# Patient Record
Sex: FEMALE | Race: BLACK OR AFRICAN AMERICAN | NOT HISPANIC OR LATINO | Employment: FULL TIME | URBAN - METROPOLITAN AREA
[De-identification: names, ages, dates, MRNs, and addresses within clinical notes are randomized per-mention and may not be internally consistent; named-entity substitution may affect disease eponyms.]

---

## 2022-03-22 ENCOUNTER — MEDICAL CORRESPONDENCE (OUTPATIENT)
Dept: HEALTH INFORMATION MANAGEMENT | Facility: CLINIC | Age: 42
End: 2022-03-22
Payer: COMMERCIAL

## 2022-03-22 ENCOUNTER — TRANSFERRED RECORDS (OUTPATIENT)
Dept: HEALTH INFORMATION MANAGEMENT | Facility: CLINIC | Age: 42
End: 2022-03-22
Payer: COMMERCIAL

## 2022-03-23 ENCOUNTER — TELEPHONE (OUTPATIENT)
Dept: ADMINISTRATIVE | Facility: CLINIC | Age: 42
End: 2022-03-23
Payer: COMMERCIAL

## 2022-03-23 NOTE — TELEPHONE ENCOUNTER
Nutrition Education Scheduling Outreach #1:    Call to patient to schedule. Left message with phone number to call to schedule.    Plan for 2nd outreach attempt within 1 week.    Dina Ravi  [employer]  Diabetes and Nutrition Scheduling

## 2022-04-11 ENCOUNTER — VIRTUAL VISIT (OUTPATIENT)
Dept: NUTRITION | Facility: CLINIC | Age: 42
End: 2022-04-11
Payer: COMMERCIAL

## 2022-04-11 DIAGNOSIS — E78.2 MIXED HYPERLIPIDEMIA: ICD-10-CM

## 2022-04-11 DIAGNOSIS — R73.03 PREDIABETES: Primary | ICD-10-CM

## 2022-04-11 PROCEDURE — 97802 MEDICAL NUTRITION INDIV IN: CPT | Mod: 95

## 2022-04-11 NOTE — PATIENT INSTRUCTIONS
Goal:   Include a fruit and/or veggie with each meal    Other tips:      Eat breakfast daily.    2. Try to eat more whole grains and cut back on added sugars and refined grains (white rice, white bread)    3. Meal plan for adding in fruits and vegetables if struggling so you have something available.  Ok to be grab and go (carrots, tomatoes, celery) or add to sandwiches (lettuce, tomatoes, peppers, pesto and Giardiniera) or salad kit.    4. Doing a great job with being active- this helps with lower blood glucose and aids weight loss.   -Aim for 150 minutes of aerobic activity a week and 2-3 days of aerobic activity a week.     5. Losing 5-10% of starting weight is often enough to help improve blood glucose.    6. When grabbing food and snacks, try to use smaller plates and pre-portion.  -Greek yogurt with a few berries, 1 slice toast with nut butter or 1/2 sandwich and veggies, veggie with hummus, 1 oz cheese and whole grain crackers, granola bar with added protein, fruit and few almonds are all healthy options.  -Try to keep snacks between 100-200 calories. Use a mix of carbohydrate (fruit or grain) with small amount protein or fat (nut, nut butter, dressing, cheese) if hungry.    7. Try to include fruits and vegetables with meals and snacks. Try Plate Method at meals:  1/2 plate veggies and/or fruit (raw, canned, frozen all fine)  1/4 plate lean meat (3-4 oz)  1/4 plate grains, ideally whole grains when able (1/2-1 cup rice/pasta/other grains/potatoes OR 1-2 slices bread)    Helpful Website: myplate.gov    FOLLOW UP APPOINTMENT: Will call you again around 12pm (noon) on Friday, May 6th.    Thanks,  Bing Gaviria,  RUSSEL, LD, [facility]   [phone]

## 2022-04-11 NOTE — PROGRESS NOTES
Type of Service: Telephone Visit    Originating Location (Patient Location): Home  Distant Location (Provider Location): Home  Mode of Communication:  Telephone    Telephone Visit Start Time: 10:01am  Telephone Visit End Time (telephone visit stop time): 11:02am    How would patient like to obtain AVS? Mail a copy      Medical Nutrition Therapy  Visit Type:Initial assessment and intervention    Jen Chun presents today for MNT and education related to prediabetes and hyperlipidemia.   She is accompanied by self.     ASSESSMENT:   Patient comments/concerns relating to nutrition: Says last fall she was diagnosed with prediabetes and was told she needs to make changes to prevent diabetes. Says she cut out soda and cut out carbs and simple sugars, eating more fruits and vegetables and cut out fast food.  Says at her recent appointment, was able to reverse effects and had normal labs.     Says she needs guidance on what she should be eating or what she should be shopping for.  Needs something easy, fast and sustainable.     Says she lost about 20 lbs with her changes since fall.  Says she is unsure of her weight. Says she gained weight and was less active with the pandemic.     NUTRITION HISTORY:    Breakfast: 1 cup coffee with blackberries and yogurt  Lunch: sandwich with cajan turkey and cheese OR ham and pepperjack cheese sandwich OR organic shrimp, rice and nikhil OR crab bites, steam bun with lettuce and shrimp  PM: almonds or cheese stick  Dinner: brown rice with chicken and nikhil OR 1 slice pizza, vegetable egg roll and crab bites OR crab bites OR sandwich  Snacks: ham and cheese sandwich OR 1 sl pizza OR white corn chips with salsa and beans OR sandwich and chips with salsa and beans   Snacks on almonds all day and 1 piece dark chocolate  Beverages: Coffee with small amount cane sugar 1x/day and Water/Sparkling water all day    Misses meals? Breakfast   Eats out:  5 meals/per week - going to the grocery  store to grab and go    Previous diet education:  Yes     Food allergies/intolerances: None     Diet is high in: protein  Diet is low in: fiber, fruits and vegetables    EXERCISE: Says she is walking more for 30-45 minutes daily.  Says there is a gym where she lives- inside the apartment building.     SOCIO/ECONOMIC:   Lives with: self    MEDICATIONS:  No current outpatient medications on file.     No current facility-administered medications for this visit.       LABS:  Last Basic Metabolic Panel:  No results found for: NA   No results found for: POTASSIUM  No results found for: CHLORIDE  No results found for: GOMEZ  No results found for: CO2  No results found for: BUN  No results found for: CR  No results found for: GLC    ANTHROPOMETRICS:  Vitals: There were no vitals taken for this visit.  There is no height or weight on file to calculate BMI.       Wt Readings from Last 5 Encounters:   No data found for Wt       Weight Change: unable to assess but per patient, lost 20 lbs    ESTIMATED KCAL REQUIREMENTS:  Unable to assess- no height/weight in chart and nothing in faxed records.  Patient says she is not sure of her weight when asked today or height and weight.    NUTRITION DIAGNOSIS: Food- and nutrition-related knowledge deficit related to changes to help reduce risk for diabetes as evidenced by patient stating she was not sure of the changes to make to reduce diabetes risk and asking for healthy eating tips and tips on grocery shopping.     NUTRITION INTERVENTION:  Nutrition Prescription: Plate Method at meals  Education given to support: general nutrition guidelines, weight reduction, consistent meals, exercise, fiber, behavior modification, portion control and heart healthy diet  Education Materials Provided: 100 Calorie Snacks, Heart Healthy Food Choices, Weight Loss Tips and Preventing Diabetes  Motivational Interviewing    Discussion: Educated on the physiology of pre-diabetes and risk factors. Discussed ways  to help with losing 5-10% of starting body weight, which can reduce insulin resistance. Commended Jen on the changes she made and reassured her making right changes to eat more fruits and vegetables, cut back on sugary foods and fast food and be more active. Suggested plate method for simplicity on balancing meals and meal planning.  Discussed easy ways to incorporate veggies or fruit with meals and trying to buy whole grains when able.  Also encouraged Jen to eat breakfast to see if this reduces hunger at night and discussed quick breakfast options.    Encouraged lean meats and healthy preparation methods to help prevent weight gain and heart-healthy fats in moderation to help lower LDL cholesterol and protect against heart disease.  Commended her on increasing activity to help with better blood glucose utilization and weight loss and informed her national recommendations are 150 minutes aerobic activity a week along with light intensity resistance training 2-3 days a week as tolerated and per MD approval.  Reviewed grocery shopping tips and healthy foods while incorporating plate method as guide as well as healthy snack ideas.  Pt verbalized understanding of concepts discussed and recommendations provided.         PATIENT'S BEHAVIOR CHANGE GOALS:   See Patient Instructions for patient stated behavior change goals. AVS was printed and mailed.    MONITOR / EVALUATE:  RD will monitor/evaluate:  Food / Beverage / Nutrient intake   Pertinent Labs  Progress toward meeting stated nutrition-related goals  Weight change    FOLLOW-UP:  Call RD with questions/concerns.   Follow-up appointment scheduled on 5/6/22.     Bing Gaviria RDN, SOPHIE, LISA   Time spent in minutes: 61 minutes  Encounter: Individual telephone visit

## 2022-05-23 NOTE — TELEPHONE ENCOUNTER
6/9/2022-Voicemail left for patient asking for info on any external records or Images-MR @ 909am      FUTURE VISIT INFORMATION      FUTURE VISIT INFORMATION:    Date: 6/16/2022    Time: 11am    [place] [facility] [place]:    Referring provider:  Jessica Mcneill PA-C     Referring providers clinic:  [facility]     Reason for visit/diagnosis  Headaches     RECORDS REQUESTED FROM:       Clinic name Comments Records Status Imaging Status   [facility]   Scanned to Chart No Images

## 2022-06-13 ENCOUNTER — VIRTUAL VISIT (OUTPATIENT)
Dept: EDUCATION SERVICES | Facility: CLINIC | Age: 42
End: 2022-06-13
Payer: COMMERCIAL

## 2022-06-13 DIAGNOSIS — R73.03 PREDIABETES: Primary | ICD-10-CM

## 2022-06-13 DIAGNOSIS — E78.2 MIXED HYPERLIPIDEMIA: ICD-10-CM

## 2022-06-13 PROCEDURE — 97803 MED NUTRITION INDIV SUBSEQ: CPT | Mod: 95 | Performed by: DIETITIAN, REGISTERED

## 2022-06-13 NOTE — PROGRESS NOTES
MEDICAL NUTRITION THERAPY  Visit Type:Initial assessment and intervention  Type of Service: Telephone Visit    Originating Location (Patient Location): Home  Distant Location (Provider Location): Home  Mode of Communication:  Telephone    Telephone Visit Start Time: 316  Telephone Visit End Time (telephone visit stop time): 354    SUBJECTIVE:   Jen Chun presents today for MNT and education related to prediabetes.   She is accompanied by self.     PATIENT STATED GOAL(S) FOR THIS VISIT: review diet, foods, eating for health and blood sugar control. Previous diet education:  Yes   Doing well with nutrition, but just has a hard time cooking with time restraints / work / life, but is trying to cook more    Discussed role of fiber and whole grains   Shops at [employer] thyme   Discused breakfast ideas; minimal ingredient breakfast bars, high fiber protein waffles etc.   Prescription bars, kodiak cakes  Minimal sancks - chips and salsa infrequently   Likes Dark chocolate   Successfully has quit soda - only bubble water       EXERCISE: moderate / strenuous regular exercise program.  Gym & walking   Activity - greatly increased, now back on site walking more versus always working from home     SOCIO/ECONOMIC:   Lives with: self    OBJECTIVE:   No results found for: A1C  Wt Readings from Last 5 Encounters:   No data found for Wt       ASSESSMENT:   Has already made many diet and lifestyle modifications to reduce blood sugars and hopefully reduce A1c back into the normal range.  Continue making these changes into habits.  Reviewed specific foods to try and ideas to focus on.   Make small goals throughout the summer.    VERBAL INFORMATION GIVEN TO SUPPORT:  Discussed: general nutrition guidelines, labeling, exercise, dining out/special occasions, fiber, behavior modification and heart healthy diet.    PLAN:   PATIENT'S BEHAVIOR CHANGE GOALS:   See Patient Instructions for patient stated behavior change goals. AVS was printed  and given to patient at today's appointment.    FOLLOW UP:   Follow-up appointment scheduled on 8/18/22.     Princess Garcia RD, LD, [facility]  Diabetes Education    Time spent in minutes: 38  Encounter: Individual

## 2022-06-13 NOTE — Clinical Note
"    6/13/2022         RE: Jen Chun  [address] [place] [address] [address]  [facility] [address]        Dear Colleague,    Thank you for referring your patient, Jen Chun, to the [facility] DIABETES EDUCATION [place]. Please see a copy of my visit note below.    MEDICAL NUTRITION THERAPY  Visit Type:Initial assessment and intervention    SUBJECTIVE:   Jen Chun presents today for MNT and education related to prediabetes.   She is accompanied by self.     PATIENT STATED GOAL(S) FOR THIS VISIT: ***    EATING HABITS:   Breakfast: ***  Lunch: ***  Dinner: ***  Snacks: ***  Beverages: {BEVERAGES per day:066088}    Misses meals? ***  Eats out:  {EATS OUT FREQUENCY:950685}     Previous diet education:  {YES/NO :210832::\"Yes\"}     Food allergies/intolerances: ***    EXERCISE: {EXERCISE PATTERN:354810}    SOCIO/ECONOMIC:   Lives with: {CDE FAMILY MEMBERS:784108}    OBJECTIVE:   Vitals: There were no vitals taken for this visit.    Weight Change: ***    ASSESSMENT:   ***  Diet is high in: {DIET ELEMENTS CDE:114933}  Diet is low in: {DIET ELEMENTS CDE:371482}    Estimated Energy Expenditure: *** kcal  Recommended Energy Intake: *** kcal  VERBAL AND WRITTEN INFORMATION GIVEN TO SUPPORT:  Discussed: {DIET EDUCATION:830473}.  Education Materials Provided: {NUTRITION EDUCATIONAL MATERIALS:217872}    PLAN:   PATIENT'S BEHAVIOR CHANGE GOALS:   See Patient Instructions for patient stated behavior change goals. AVS was printed and given to patient at today's appointment.    FOLLOW UP:   {MNT Follow-up:666532}    ***  Time spent in minutes: ***  Encounter: Individual      Doing well with nutrition before hand   Hard time cooking - so busy working     Whole grain bread   Fresh thyme     paretns always ate healthy   Feels better   Losing weight     Activity - greatly increased, now back on site walking more     Trying to cook more     Prescription bars, kodiak cakes  Fiber   miniaml sancks - cheese / chips / salsa  Dark " chocolate   replaced soda with bubble water   Breakfast -     [employer] [facility] in [place], might go to [facility]       MEDICAL NUTRITION THERAPY  Visit Type:Initial assessment and intervention  Type of Service: Telephone Visit    Originating Location (Patient Location): Home  Distant Location (Provider Location): Home  Mode of Communication:  Telephone    Telephone Visit Start Time: 316  Telephone Visit End Time (telephone visit stop time): 354    SUBJECTIVE:   Jen Chun presents today for MNT and education related to prediabetes.   She is accompanied by self.     PATIENT STATED GOAL(S) FOR THIS VISIT: review diet, foods, eating for health and blood sugar control. Previous diet education:  Yes   Doing well with nutrition, but just has a hard time cooking with time restraints / work / life, but is trying to cook more    Discussed role of fiber and whole grains   Shops at [employer]   Discused breakfast ideas; minimal ingredient breakfast bars, high fiber protein waffles etc.   Prescription bars, kodiak cakes  Minimal sancks - chips and salsa infrequently   Likes Dark chocolate   Successfully has quit soda - only bubble water       EXERCISE: moderate / strenuous regular exercise program.  Gym & walking   Activity - greatly increased, now back on site walking more versus always working from home     SOCIO/ECONOMIC:   Lives with: self    OBJECTIVE:   No results found for: A1C  Wt Readings from Last 5 Encounters:   No data found for Wt       ASSESSMENT:   Has already made many diet and lifestyle modifications to reduce blood sugars and hopefully reduce A1c back into the normal range.  Continue making these changes into habits.  Reviewed specific foods to try and ideas to focus on.   Make small goals throughout the summer.    VERBAL INFORMATION GIVEN TO SUPPORT:  Discussed: general nutrition guidelines, labeling, exercise, dining out/special occasions, fiber, behavior modification and heart healthy  diet.    PLAN:   PATIENT'S BEHAVIOR CHANGE GOALS:   See Patient Instructions for patient stated behavior change goals. AVS was printed and given to patient at today's appointment.    FOLLOW UP:   Follow-up appointment scheduled on 8/18/22.     Princess Garcia RD, LD, [facility]  Diabetes Education    Time spent in minutes: 38  Encounter: Individual

## 2022-06-16 ENCOUNTER — VIRTUAL VISIT (OUTPATIENT)
Dept: NEUROLOGY | Facility: CLINIC | Age: 42
End: 2022-06-16
Payer: COMMERCIAL

## 2022-06-16 ENCOUNTER — PRE VISIT (OUTPATIENT)
Dept: NEUROLOGY | Facility: CLINIC | Age: 42
End: 2022-06-16

## 2022-06-16 DIAGNOSIS — R51.9 HEADACHE, WORSENING: Primary | ICD-10-CM

## 2022-06-16 DIAGNOSIS — G43.009 MIGRAINE WITHOUT AURA AND WITHOUT STATUS MIGRAINOSUS, NOT INTRACTABLE: ICD-10-CM

## 2022-06-16 PROCEDURE — 99203 OFFICE O/P NEW LOW 30 MIN: CPT | Mod: GT | Performed by: NURSE PRACTITIONER

## 2022-06-16 RX ORDER — NAPROXEN 500 MG/1
500 TABLET ORAL EVERY 12 HOURS PRN
Qty: 30 TABLET | Refills: 3 | Status: SHIPPED | OUTPATIENT
Start: 2022-06-16 | End: 2022-10-10

## 2022-06-16 RX ORDER — SUMATRIPTAN 50 MG/1
50 TABLET, FILM COATED ORAL
Qty: 12 TABLET | Refills: 9 | Status: SHIPPED | OUTPATIENT
Start: 2022-06-16 | End: 2022-10-10

## 2022-06-16 RX ORDER — RIBOFLAVIN (VITAMIN B2) 100 MG
200 TABLET ORAL DAILY
Qty: 60 TABLET | Refills: 11 | Status: SHIPPED | OUTPATIENT
Start: 2022-06-16 | End: 2022-10-10

## 2022-06-16 ASSESSMENT — HEADACHE IMPACT TEST (HIT 6)
HOW OFTEN DO HEADACHES LIMIT YOUR DAILY ACTIVITIES: RARELY
WHEN YOU HAVE A HEADACHE HOW OFTEN DO YOU WISH YOU COULD LIE DOWN: VERY OFTEN
HOW OFTEN HAVE YOU FELT FED UP OR IRRITATED BECAUSE OF YOUR HEADACHES: NEVER
WHEN YOU HAVE A HEADACHE HOW OFTEN DO YOU WISH YOU COULD LIE DOWN: VERY OFTEN
HIT6 TOTAL SCORE: 51
HOW OFTEN DID HEADACHS LIMIT CONCENTRATION ON WORK OR DAILY ACTIVITY: RARELY
HOW OFTEN HAVE YOU FELT TOO TIRED TO WORK BECAUSE OF YOUR HEADACHES: RARELY
HOW OFTEN HAVE YOU FELT FED UP OR IRRITATED BECAUSE OF YOUR HEADACHES: NEVER
HOW OFTEN DO HEADACHES LIMIT YOUR DAILY ACTIVITIES: RARELY
WHEN YOU HAVE HEADACHES HOW OFTEN IS THE PAIN SEVERE: SOMETIMES
HOW OFTEN DID HEADACHS LIMIT CONCENTRATION ON WORK OR DAILY ACTIVITY: RARELY
WHEN YOU HAVE HEADACHES HOW OFTEN IS THE PAIN SEVERE: SOMETIMES
HOW OFTEN HAVE YOU FELT TOO TIRED TO WORK BECAUSE OF YOUR HEADACHES: RARELY
HIT6 TOTAL SCORE: 51

## 2022-06-16 ASSESSMENT — MIGRAINE DISABILITY ASSESSMENT (MIDAS)
TOTAL SCORE: 120
HOW MANY DAYS DID YOU NOT DO HOUSEWORK BECAUSE OF HEADACHES: 15
HOW MANY DAYS WAS YOUR PRODUCTIVITY CUT IN HALF BECAUSE OF HEADACHES: 30
HOW MANY DAYS IN THE PAST 3 MONTHS HAVE YOU HAD A HEADACHE: 15
HOW MANY DAYS WAS HOUSEWORK PRODUCTIVITY CUT IN HALF DUE TO HEADACHES: 15
HOW MANY DAYS DID YOU MISS WORK OR SCHOOL BECAUSE OF HEADACHES: 30
HOW OFTEN WERE SOCIAL ACTIVITIES MISSED DUE TO HEADACHES: 30
ON A SCALE FROM 0-10 ON AVERAGE HOW PAINFUL WERE HEADACHES: 6

## 2022-06-16 NOTE — LETTER
6/16/2022       RE: Jen Chun  [address]  [facility] [address]     Dear Colleague,    Thank you for referring your patient, Jen Chun, to the [facility] NEUROLOGY CLINIC [place] [facility]. Please see a copy of my visit note below.    ASSESSMENT AND PLAN:  Headaches appear to be migrainous in phenotype. Headaches in the past but more frequent and severe in Jan 2022. Improved with leaving job.   Plan  Baseline brain MRI for worsening headaches or pattern change  Optimizing rescue treatment -  A trial of sumatriptan at headache onset and limit use to no more than 9 days per month   May take sumatriptan with naproxen   Naproxen 2 tabs OTC every 12 hours as needed with food but limit use of naproxen or other OTC analgesics to no more than 14 days per month   Ondansetron as needed for nausea   Preventative treatment -may consider if headaches were more frequent 10 or more per month    In meanwhile prevention -stay hydrated, sleep routine  Vitamin B2 200-400 mg OTC daily -has a mild evidence to help with migraine headaches   Follow up in 3 months or sooner if needed       Subjective   Jen is a 41 year old, presenting for the following health issues:Headache       HPI   Headaches onset in Jan 2022 but occasional in the past but not as severe. 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   Fr-feb -almost daily and on leave in March -less in March and April   May -7 total -duration all day -24 hours   Last big headache a month ago  Loc-top of the head-feels like a splitting headache and sometimes holocephalic-throbbing and severe enough that puts patient in bed for a few days  Associated with nausea, throwing up sometimes, not so much noise sensitivity but a mild light sensitivity, fatigue  "and tiredness  No visual changes  No numbness or weakness in the UE or LE     Gets monthly cycle but headaches are not correlated with menstrual cycle  No head injuries     [id number] had COVID19 -works remotely and lives a lot.     FH-No family history of headaches     Headache treatment   Tylenol     Objective    Vitals - Patient Reported  Weight (Patient Reported): 68 kg (150 lb)  Height (Patient Reported): 165.1 cm (5' 5\")  BMI (Based on Pt Reported Ht/Wt): 24.96  Pain Score: Mild Pain (2)  Pain Loc: Head      Physical Exam   GENERAL: Healthy, alert and no distress  EYES: Eyes grossly normal to inspection.  No discharge or erythema, or obvious scleral/conjunctival abnormalities.  RESP: No audible wheeze, cough, or visible cyanosis.  No visible retractions or increased work of breathing.    SKIN: Visible skin clear. No significant rash, abnormal pigmentation or lesions.  NEURO: Face is symmetrical and symmetrical facial expressions, no weakness.  Mentation and speech appropriate for age.  PSYCH: Mentation appears normal, affect normal, judgement and insight intact, normal speech and appearance well-groomed.    I discussed all my recommendations with Jen Chun who verbalizes understanding and comfortable with the plan.  All of patient's questions were answered from the best of my knowledge.  Patient is in agreement with the plan.     36 minutes spent on the date of the encounter doing video access, chart  review,  results review,  meds review, treatment plan, documentation and further activities as noted above    FATOUMATA Ornelas, CNP [facility]  Headache certified  [facility]  "

## 2022-06-16 NOTE — PROGRESS NOTES
Jen is a 41 year old who is being evaluated via a billable video visit.      How would you like to obtain your AVS? Mail a copy  If the video visit is dropped, the invitation should be resent by: Send to e-mail at: [email or web address]  Will anyone else be joining your video visit? No        Video-Visit Details    Video Start Time: 11:03 AM    Type of service:  Video Visit    Video End Time:11:34 AM    Originating Location (pt. Location): Home    Distant Location (provider location):  [facility] NEUROLOGY [facility]     Platform used for Video Visit: Mantex       ASSESSMENT AND PLAN:  Headaches appear to be migrainous in phenotype. Headaches in the past but more frequent and severe in Jan 2022. Improved with leaving job.   Plan  Baseline brain MRI for worsening headaches or pattern change  Optimizing rescue treatment -  A trial of sumatriptan at headache onset and limit use to no more than 9 days per month   May take sumatriptan with naproxen   Naproxen 2 tabs OTC every 12 hours as needed with food but limit use of naproxen or other OTC analgesics to no more than 14 days per month   Ondansetron as needed for nausea   Preventative treatment -may consider if headaches were more frequent 10 or more per month    In meanwhile prevention -stay hydrated, sleep routine  Vitamin B2 200-400 mg OTC daily -has a mild evidence to help with migraine headaches   Follow up in 3 months or sooner if needed       Subjective   Jen is a 41 year old, presenting for the following health issues:Headache       HPI   Headaches onset in Jan 2022 but occasional in the past but not as severe. 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   Fr-feb -almost daily and on leave in March -less in March and April   May -7 total -duration all day -24 hours   Last big headache a  "month ago  Loc-top of the head-feels like a splitting headache and sometimes holocephalic-throbbing and severe enough that puts patient in bed for a few days  Associated with nausea, throwing up sometimes, not so much noise sensitivity but a mild light sensitivity, fatigue and tiredness  No visual changes  No numbness or weakness in the UE or LE     Gets monthly cycle but headaches are not correlated with menstrual cycle  No head injuries     [id number] had COVID19 -works remotely and lives a lot.     FH-No family history of headaches     Headache treatment   Tylenol     Objective    Vitals - Patient Reported  Weight (Patient Reported): 68 kg (150 lb)  Height (Patient Reported): 165.1 cm (5' 5\")  BMI (Based on Pt Reported Ht/Wt): 24.96  Pain Score: Mild Pain (2)  Pain Loc: Head      Physical Exam   GENERAL: Healthy, alert and no distress  EYES: Eyes grossly normal to inspection.  No discharge or erythema, or obvious scleral/conjunctival abnormalities.  RESP: No audible wheeze, cough, or visible cyanosis.  No visible retractions or increased work of breathing.    SKIN: Visible skin clear. No significant rash, abnormal pigmentation or lesions.  NEURO: Face is symmetrical and symmetrical facial expressions, no weakness.  Mentation and speech appropriate for age.  PSYCH: Mentation appears normal, affect normal, judgement and insight intact, normal speech and appearance well-groomed.    I discussed all my recommendations with Jen Chun who verbalizes understanding and comfortable with the plan.  All of patient's questions were answered from the best of my knowledge.  Patient is in agreement with the plan.     36 minutes spent on the date of the encounter doing video access, chart  review,  results review,  meds review, treatment plan, documentation and further activities as noted above    FATOUMATA Ornelas, CNP [facility]  Headache certified  [facility]    "

## 2022-06-16 NOTE — PATIENT INSTRUCTIONS
Plan  Baseline brain MRI for worsening headaches or pattern change  Optimizing rescue treatment -  A trial of sumatriptan at headache onset and limit use to no more than 9 days per month   May take sumatriptan with naproxen   Naproxen 2 tabs OTC every 12 hours as needed with food but limit use of naproxen or other OTC analgesics to no more than 14 days per month   Ondansetron as needed for nausea   Preventative treatment -may consider if headaches were more frequent 10 or more per month    In meanwhile prevention -stay hydrated, sleep routine  Vitamin B2 200-400 mg OTC daily -has a mild evidence to help with migraine headaches   Follow up in 3 months or sooner if needed

## 2022-06-26 ENCOUNTER — ANCILLARY PROCEDURE (OUTPATIENT)
Dept: MRI IMAGING | Facility: CLINIC | Age: 42
End: 2022-06-26
Attending: NURSE PRACTITIONER
Payer: COMMERCIAL

## 2022-06-26 DIAGNOSIS — R51.9 HEADACHE, WORSENING: ICD-10-CM

## 2022-06-26 PROCEDURE — 70551 MRI BRAIN STEM W/O DYE: CPT | Performed by: RADIOLOGY

## 2022-06-30 ENCOUNTER — TELEPHONE (OUTPATIENT)
Dept: NEUROLOGY | Facility: CLINIC | Age: 42
End: 2022-06-30

## 2022-06-30 NOTE — TELEPHONE ENCOUNTER
"I called pt to let her know Rita reviewed her brain MRI results. Per Rita, \"Brain MRI results reviewed and nothing acute at this time. The findings are non specific. We'll review images with patient at patient's next follow up visit.\"    Pt has no questions. I will send her a copy of MRI report as she does not have mychart set up.     Anel ALEXIS  "

## 2022-08-18 ENCOUNTER — VIRTUAL VISIT (OUTPATIENT)
Dept: EDUCATION SERVICES | Facility: CLINIC | Age: 42
End: 2022-08-18
Payer: COMMERCIAL

## 2022-08-18 DIAGNOSIS — R73.03 PREDIABETES: Primary | ICD-10-CM

## 2022-08-18 DIAGNOSIS — E78.2 MIXED HYPERLIPIDEMIA: ICD-10-CM

## 2022-08-18 PROCEDURE — 97802 MEDICAL NUTRITION INDIV IN: CPT | Mod: 95 | Performed by: DIETITIAN, REGISTERED

## 2022-08-18 NOTE — LETTER
8/18/2022         RE: Jen Chun  [address] [place] [address] [address]  [facility] [address]        Dear Colleague,    Thank you for referring your patient, Jen Chun, to the [facility] DIABETES EDUC[facility] [place]. Please see a copy of my visit note below.    MEDICAL NUTRITION THERAPY  Visit Type:Reassessment and intervention  Type of Service: Telephone Visit    Originating Location (Patient Location): Other work  Distant Location (Provider Location): Home  Mode of Communication:  Telephone    Telephone Visit Start Time: 1206  Telephone Visit End Time (telephone visit stop time): 1226    SUBJECTIVE:   Jen Chun presents today for MNT and education related to prediabetes.   She is accompanied by self.     PATIENT STATED GOAL(S) FOR THIS VISIT:  Review foods / meal planning     Nutrition   Went back to work in person 5 days a week so is now packing foods instead of being able to plan at home  Is working on healthy cooking incorporating many types of vegetables, whole grains etc.   Trying to bring food to work versus going out  Breakfast - trying to find quicker foods to add versus just coffee   Shops at [employer]     EXERCISE: not assessed at this visit     SOCIO/ECONOMIC:   Lives with: self    OBJECTIVE:   No results found for: A1C  Wt Readings from Last 5 Encounters:   No data found for Wt       ASSESSMENT:   Jen is doing well focusing on a healthy diet.  Is watching portions, focusing on healthy choices.  Do not recommend tracking / calorie counting and instead using more intuitive eating skills.  Reviewed certain foods items to try adding.  Focus on key ideas such as protein sources and building meals around this, whole grains, high fiber, healthy fats etc.       VERBAL AND WRITTEN INFORMATION GIVEN TO SUPPORT:  Discussed: general nutrition guidelines, labeling, dining out/special occasions, fiber, behavior modification and portion control.  PLAN:   PATIENT'S BEHAVIOR CHANGE GOALS:    Continue with positive diet & lifestyle changes       FOLLOW UP:   Follow-up appointment scheduled on 10/13.     Princess Garcia RD, LD, [facility]  Diabetes Education    Time spent in minutes: 20  Encounter: Individual

## 2022-10-10 ENCOUNTER — OFFICE VISIT (OUTPATIENT)
Dept: NEUROLOGY | Facility: CLINIC | Age: 42
End: 2022-10-10
Payer: COMMERCIAL

## 2022-10-10 VITALS
OXYGEN SATURATION: 96 % | HEART RATE: 101 BPM | WEIGHT: 201.5 LBS | SYSTOLIC BLOOD PRESSURE: 132 MMHG | DIASTOLIC BLOOD PRESSURE: 84 MMHG

## 2022-10-10 DIAGNOSIS — R90.82 WHITE MATTER DISEASE, UNSPECIFIED: Primary | ICD-10-CM

## 2022-10-10 DIAGNOSIS — G43.009 MIGRAINE WITHOUT AURA AND WITHOUT STATUS MIGRAINOSUS, NOT INTRACTABLE: ICD-10-CM

## 2022-10-10 PROCEDURE — 99213 OFFICE O/P EST LOW 20 MIN: CPT | Performed by: NURSE PRACTITIONER

## 2022-10-10 RX ORDER — SUMATRIPTAN 50 MG/1
50 TABLET, FILM COATED ORAL
Qty: 12 TABLET | Refills: 11 | Status: SHIPPED | OUTPATIENT
Start: 2022-10-10 | End: 2024-04-18

## 2022-10-10 RX ORDER — NAPROXEN 500 MG/1
500 TABLET ORAL EVERY 12 HOURS PRN
Qty: 30 TABLET | Refills: 3 | Status: SHIPPED | OUTPATIENT
Start: 2022-10-10 | End: 2024-04-18

## 2022-10-10 RX ORDER — RIBOFLAVIN (VITAMIN B2) 100 MG
200 TABLET ORAL DAILY
Qty: 60 TABLET | Refills: 12 | Status: SHIPPED | OUTPATIENT
Start: 2022-10-10 | End: 2024-04-18

## 2022-10-10 RX ORDER — ACETAMINOPHEN 500 MG
500-1000 TABLET ORAL EVERY 6 HOURS PRN
COMMUNITY

## 2022-10-10 ASSESSMENT — PAIN SCALES - GENERAL: PAINLEVEL: NO PAIN (0)

## 2022-10-10 NOTE — PATIENT INSTRUCTIONS
Plan:  Headache treatment -no changes -sumatriptan as needed and limit use to no more than 9 days per month   Naproxen as needed   Blood pressure monitor   Keep blood sugar controlled   Updated eye exam   Return 6-9 months  or sooner if needed -sooner if headache coming back

## 2022-10-10 NOTE — PROGRESS NOTES
Ayesha Li is a 41 year old presenting for the following health issues: headache   RECHECK (3 mo follow up)  Last Headache Clinic visit 6/16/2022, see note for details.   Headaches onset in Jan 2022 but occasional in the past but not as severe. 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   Today reports Less frequent headaches since initial Headache Clinic visit. Tried rescue treatment - sumatriptan and naproxen in combination -felt much better in 2-3 hours. No side effects and felt better.   Sumatriptan use 2-3 times since last visit in June 2022.   Vit B2 for prevention.     Last fall was Dx -preDM and dietary changes.   Brain MRI without contrast -non specific changes. No symptoms to explain.   Vision is Ok.     Plan:  Headache treatment -no changes -sumatriptan as needed and limit use to no more than 9 days per month   Naproxen as needed   Blood pressure monitor   Keep blood sugar controlled   Updated eye exam   Return 6-9 months  or sooner if needed -sooner if headache coming back     Objective    /84 (BP Location: Right arm, Patient Position: Sitting, Cuff Size: Adult Large)   Pulse 101   Wt 91.4 kg (201 lb 8 oz)   SpO2 96%   There is no height or weight on file to calculate BMI.  Physical Exam   GENERAL: healthy, alert and no distress  EYES: Eyes grossly normal to inspection, PERRL and conjunctivae and sclerae normal  NEURO: Normal strength and tone, mentation intact and speech normal  PSYCH: mentation appears normal, affect normal/bright    Diagnostic Test Results: reviewed and non specific findings  MR BRAIN W/O CONTRAST 6/26/2022 1:06 PM     Comparison:  No similar studies.      Technique: Sagittal T1-weighted, coronal T2-weighted, axial T2 FLAIR,  axial susceptibility weighted, and axial diffusion-weighted with ADC  map images of the brain  were obtained without intravenous contrast.     Findings: Several scattered nonspecific foci of T2 white matter  hyperintensity in both cerebral hemispheres, greatest in the frontal  lobes. No intracranial mass lesion, mass effect, midline shift, or  abnormal fluid collection. The ventricles and sulci are normal for  age. No abnormality of reduced diffusion.  Normal intravascular flow  voids.                                                                      Impression: No acute intracranial pathology. Several scattered  nonspecific foci of T2 white matter hyperintensity, slightly out of  proportion to age.     ANJU SOSA MD     I discussed all my recommendations with Jen Chun who verbalizes understanding and comfortable with the plan.  All of patient's questions were answered from the best of my knowledge.  Patient is in agreement with the plan.     25 minutes spent on the date of the encounter doing face to face  access, chart  review, exam, results review,  meds review, treatment plan, documentation and further activities as noted above    FATOUMATA Ornelas, CNP [facility]  Headache certified  [facility]

## 2022-10-10 NOTE — LETTER
10/10/2022       RE: Jen Chun  [address]  [facility] [address]     Dear Colleague,    Thank you for referring your patient, Jen Chun, to the [facility] NEUROLOGY CLINIC [place] at [facility]. Please see a copy of my visit note below.      Subjective   Jen is a 41 year old presenting for the following health issues: headache   RECHECK (3 mo follow up)  Last Headache Clinic visit 6/16/2022, see note for details.   Headaches onset in Jan 2022 but occasional in the past but not as severe. 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   Today reports Less frequent headaches since initial Headache Clinic visit. Tried rescue treatment - sumatriptan and naproxen in combination -felt much better in 2-3 hours. No side effects and felt better.   Sumatriptan use 2-3 times since last visit in June 2022.   Vit B2 for prevention.     Last fall was Dx -preDM and dietary changes.   Brain MRI without contrast -non specific changes. No symptoms to explain.   Vision is Ok.     Plan:  Headache treatment -no changes -sumatriptan as needed and limit use to no more than 9 days per month   Naproxen as needed   Blood pressure monitor   Keep blood sugar controlled   Updated eye exam   Return 6-9 months  or sooner if needed -sooner if headache coming back     Objective    /84 (BP Location: Right arm, Patient Position: Sitting, Cuff Size: Adult Large)   Pulse 101   Wt 91.4 kg (201 lb 8 oz)   SpO2 96%   There is no height or weight on file to calculate BMI.  Physical Exam   GENERAL: healthy, alert and no distress  EYES: Eyes grossly normal to inspection, PERRL and conjunctivae and sclerae normal  NEURO: Normal strength and tone, mentation intact and speech normal  PSYCH: mentation appears normal, affect  normal/bright    Diagnostic Test Results: reviewed and non specific findings  MR BRAIN W/O CONTRAST 6/26/2022 1:06 PM     Comparison:  No similar studies.      Technique: Sagittal T1-weighted, coronal T2-weighted, axial T2 FLAIR,  axial susceptibility weighted, and axial diffusion-weighted with ADC  map images of the brain were obtained without intravenous contrast.     Findings: Several scattered nonspecific foci of T2 white matter  hyperintensity in both cerebral hemispheres, greatest in the frontal  lobes. No intracranial mass lesion, mass effect, midline shift, or  abnormal fluid collection. The ventricles and sulci are normal for  age. No abnormality of reduced diffusion.  Normal intravascular flow  voids.                                                                      Impression: No acute intracranial pathology. Several scattered  nonspecific foci of T2 white matter hyperintensity, slightly out of  proportion to age.     ANJU SOSA MD     I discussed all my recommendations with Jen Chun who verbalizes understanding and comfortable with the plan.  All of patient's questions were answered from the best of my knowledge.  Patient is in agreement with the plan.     25 minutes spent on the date of the encounter doing face to face  access, chart  review, exam, results review,  meds review, treatment plan, documentation and further activities as noted above        Again, thank you for allowing me to participate in the care of your patient.      Sincerely,    FATOUMATA Donohue CNP

## 2022-10-13 ENCOUNTER — VIRTUAL VISIT (OUTPATIENT)
Dept: EDUCATION SERVICES | Facility: CLINIC | Age: 42
End: 2022-10-13
Payer: COMMERCIAL

## 2022-10-13 DIAGNOSIS — R73.03 PREDIABETES: Primary | ICD-10-CM

## 2022-10-13 PROCEDURE — 97803 MED NUTRITION INDIV SUBSEQ: CPT | Mod: 95 | Performed by: DIETITIAN, REGISTERED

## 2022-10-13 NOTE — PROGRESS NOTES
MEDICAL NUTRITION THERAPY  Visit Type:reassessment and intervention    Type of Service: Telephone Visit    Originating Location (Patient Location): Home  Distant Location (Provider Location): Home  Mode of Communication:  Telephone    Telephone Visit Start Time: 1220  Telephone Visit End Time (telephone visit stop time): 1251      SUBJECTIVE:   Jen Chun presents today for MNT and education related to prediabetes.   She is accompanied by self.     PATIENT STATED GOAL(S) FOR THIS VISIT: Reviewed diet / foods / routines   New work routine - on site more and is eating more at work - going out for lunches etc.  Trying to find a new healthy pattern with this major change in work and lifestyle.     Diet  Bringing some foods to work   Often getting Salads / sandwiches / sushi   Reviewed snack ideas   Fruits, nuts, bars, mixed macros, increasing fiber     EXERCISE:   More active and reviewed pathophysiology of how activity lower blood sugars   leaving the house more.  Loves yoga and is making time for this   Apartment has a gym for acitity and does this in the evenings.   works out in the evening - 30-60 minutes     OBJECTIVE:    recommend new A1c for follow up     ASSESSMENT:   Continue with positive diet & lifestyle changes.  REviewed techniues with going out to eat, foods to bring to work.  Follow-up later this fall / early winter.   Discussed: general nutrition guidelines, labeling, exercise, dining out/special occasions, fiber and behavior modification.    PLAN:   PATIENT'S BEHAVIOR CHANGE GOALS:   Continue with positive diet & lifestyle changes   Follow up in November       FOLLOW UP:   Follow up with RD as needed.  New a1c - establish     Princess Garcia RD, LD, [facility]  Diabetes Education    Time spent in minutes: 30  Encounter: Individual

## 2022-12-08 ENCOUNTER — VIRTUAL VISIT (OUTPATIENT)
Dept: EDUCATION SERVICES | Facility: CLINIC | Age: 42
End: 2022-12-08
Payer: COMMERCIAL

## 2022-12-08 DIAGNOSIS — R73.03 PREDIABETES: Primary | ICD-10-CM

## 2022-12-08 PROCEDURE — 97802 MEDICAL NUTRITION INDIV IN: CPT | Mod: 95 | Performed by: DIETITIAN, REGISTERED

## 2022-12-08 NOTE — LETTER
12/8/2022         RE: Jen Chun  [address]  [facility] [address]        Dear Colleague,    Thank you for referring your patient, Jen Chun, to the [facility]ABETES [facility]UC[facility] [place]. Please see a copy of my visit note below.    MEDICAL NUTRITION THERAPY  Visit Type:Reassessment and intervention  Type of Service: Telephone Visit     Originating Location (Patient Location): Other work  Distant Location (Provider Location): Home  Mode of Communication:  Telephone     Telephone Visit Start Time: 1206  Telephone Visit End Time (telephone visit stop time): 1220     SUBJECTIVE:   Jen Chun presents today for MNT and education related to prediabetes.   She is accompanied by self.      PATIENT STATED GOAL(S) FOR THIS VISIT:  General check in     Nutrition   Shops at [employer] - doing a lot of day of shopping to reduce waste      EXERCISE: not assessed at this visit      SOCIO/ECONOMIC:   Lives with: self     OBJECTIVE:   No results found for: A1C  Wt Readings from Last 5 Encounters:   No data found for Wt         ASSESSMENT:   Jen is doing well focusing on a healthy diet.  Is watching portions, focusing on healthy choices. Overall is feeling better and more energetic.  Will make follow up with PCP soon for labs.     Focusing on increasing activity and is doing this most days 5-7 times weekly.    20 minutes cardio + weight training + yoga     Working a hybrid schedule again and finding a good balance   Being very mindful of choices with food   Increasing salads / vegetables   Small choices that add up  Balance of fat / protein / carbohydrates / fiber    Goal - water, was drinking when working from home more   Less coffee     VERBAL AND WRITTEN INFORMATION GIVEN TO SUPPORT:  Discussed: general nutrition guidelines, labeling, dining out/special occasions, fiber, behavior modification and portion control.    PLAN:   PATIENT'S BEHAVIOR CHANGE GOALS:   Continue with positive  diet & lifestyle changes         FOLLOW UP:   Follow-up appointment scheduled February 2023     Princess Garcia RD, LD, [facility]  Diabetes Education     Time spent in minutes: 14  Encounter: Individual    PCP / referral

## 2022-12-08 NOTE — PROGRESS NOTES
MEDICAL NUTRITION THERAPY  Visit Type:Reassessment and intervention  Type of Service: Telephone Visit     Originating Location (Patient Location): Other work  Distant Location (Provider Location): Home  Mode of Communication:  Telephone     Telephone Visit Start Time: 1206  Telephone Visit End Time (telephone visit stop time): 1220     SUBJECTIVE:   Jen Chun presents today for MNT and education related to prediabetes.   She is accompanied by self.      PATIENT STATED GOAL(S) FOR THIS VISIT:  General check in     Nutrition   Shops at [employer] - doing a lot of day of shopping to reduce waste      EXERCISE: not assessed at this visit      SOCIO/ECONOMIC:   Lives with: self     OBJECTIVE:   No results found for: A1C  Wt Readings from Last 5 Encounters:   No data found for Wt         ASSESSMENT:   Jen is doing well focusing on a healthy diet.  Is watching portions, focusing on healthy choices. Overall is feeling better and more energetic.  Will make follow up with PCP soon for labs.     Focusing on increasing activity and is doing this most days 5-7 times weekly.    20 minutes cardio + weight training + yoga     Working a hybrid schedule again and finding a good balance   Being very mindful of choices with food   Increasing salads / vegetables   Small choices that add up  Balance of fat / protein / carbohydrates / fiber    Goal - water, was drinking when working from home more   Less coffee     VERBAL AND WRITTEN INFORMATION GIVEN TO SUPPORT:  Discussed: general nutrition guidelines, labeling, dining out/special occasions, fiber, behavior modification and portion control.    PLAN:   PATIENT'S BEHAVIOR CHANGE GOALS:   Continue with positive diet & lifestyle changes         FOLLOW UP:   Follow-up appointment scheduled February 2023     Princess Garcia RD, S[place], [facility]  Diabetes Education     Time spent in minutes: 14  Encounter: Individual    PCP / referral

## 2023-02-17 ENCOUNTER — VIRTUAL VISIT (OUTPATIENT)
Dept: EDUCATION SERVICES | Facility: CLINIC | Age: 43
End: 2023-02-17
Payer: COMMERCIAL

## 2023-02-17 DIAGNOSIS — R73.03 PREDIABETES: Primary | ICD-10-CM

## 2023-02-17 NOTE — LETTER
2/17/2023         RE: Jen Chun  [address] [place] [address] [address]  [facility] [place] [address]        Dear Colleague,    Thank you for referring your patient, Jen Chun, to the [facility]ABETES [facility]UC[facility] [place]. Please see a copy of my visit note below.    Patient no-showed today's appointment; appointment was for Diabetes Education.     Left voicemail with re-scheduling number.     Princess Garcia RD, LD, CDE  Diabetes Education

## 2023-02-17 NOTE — PROGRESS NOTES
Patient no-showed today's appointment; appointment was for Diabetes Education.     Left voicemail with re-scheduling number.     Princess Garcia RD, LD, CDE  Diabetes Education

## 2023-04-05 ASSESSMENT — HEADACHE IMPACT TEST (HIT 6)
HIT6 TOTAL SCORE: 46
HOW OFTEN HAVE YOU FELT FED UP OR IRRITATED BECAUSE OF YOUR HEADACHES: NEVER
HOW OFTEN HAVE YOU FELT TOO TIRED TO WORK BECAUSE OF YOUR HEADACHES: NEVER
HOW OFTEN DID HEADACHS LIMIT CONCENTRATION ON WORK OR DAILY ACTIVITY: NEVER
WHEN YOU HAVE A HEADACHE HOW OFTEN DO YOU WISH YOU COULD LIE DOWN: SOMETIMES
HOW OFTEN DO HEADACHES LIMIT YOUR DAILY ACTIVITIES: RARELY
WHEN YOU HAVE HEADACHES HOW OFTEN IS THE PAIN SEVERE: SOMETIMES

## 2023-04-10 ENCOUNTER — OFFICE VISIT (OUTPATIENT)
Dept: NEUROLOGY | Facility: CLINIC | Age: 43
End: 2023-04-10
Payer: COMMERCIAL

## 2023-04-10 VITALS — OXYGEN SATURATION: 97 % | HEART RATE: 82 BPM

## 2023-04-10 DIAGNOSIS — G43.009 MIGRAINE WITHOUT AURA AND WITHOUT STATUS MIGRAINOSUS, NOT INTRACTABLE: Primary | ICD-10-CM

## 2023-04-10 PROCEDURE — 99213 OFFICE O/P EST LOW 20 MIN: CPT | Performed by: NURSE PRACTITIONER

## 2023-04-10 NOTE — LETTER
4/10/2023       RE: Jen Chun  [address]  [facility] [address]     Dear Colleague,    Thank you for referring your patient, Jen Chun, to the [facility] NEUROLOGY CLINIC [place] at [facility]. Please see a copy of my visit note below.      Subjective   Jen is a 42 year old, presenting for the following health issues:headache  RECHECK  Last visit 6/16/2022< see note for details  Patient takes riboflavin and has been helpful with migraine prevention  Headaches less frequent and severe. Sumatriptan every few months when has a headache and effective and before was in bed and now feels better in a few hours. No side effects.   No new changes    Brain MRI reviewed and no acute findings to explain headaches on non contrast brain MRI  MR BRAIN W/O CONTRAST 6/26/2022 1:06 PM        Findings: Several scattered nonspecific foci of T2 white matter  hyperintensity in both cerebral hemispheres, greatest in the frontal  lobes. No intracranial mass lesion, mass effect, midline shift, or  abnormal fluid collection. The ventricles and sulci are normal for  age. No abnormality of reduced diffusion.  Normal intravascular flow  voids.                                                                      Impression: No acute intracranial pathology. Several scattered  nonspecific foci of T2 white matter hyperintensity, slightly out of  proportion to age.     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   Limit use of sumatriptan to no more than 9 days per month and use it if blood pressure controlled   Check blood pressure at home and record for a week -morning and before bedtime and if remains talk to your primary  Stress management , hydration, activity level   Headache Clinic follow up in 9-12 months or sooner if needed         Objective    Pulse 82   SpO2 97%   There is  no height or weight on file to calculate BMI.  Physical Exam   GENERAL: healthy, alert and no distress  EYES: Eyes grossly normal to inspection  NEURO: Normal strength and tone,  speech normal, normal casual gait  PSYCH: mentation appears normal, affect normal    I discussed all my recommendations with Jen Chun who verbalizes understanding and comfortable with the plan.  All of patient's questions were answered from the best of my knowledge.  Patient is in agreement with the plan.     24 minutes spent on the date of the encounter doing face to face visit , chart  review,  meds review, treatment plan, documentation and further activities as noted above            Again, thank you for allowing me to participate in the care of your patient.      Sincerely,    FATOUMATA Donohue CNP

## 2023-04-10 NOTE — PROGRESS NOTES
Subjective   Jen is a 42 year old, presenting for the following health issues:headache  RECHECK  Last visit 6/16/2022< see note for details  Patient takes riboflavin and has been helpful with migraine prevention  Headaches less frequent and severe. Sumatriptan every few months when has a headache and effective and before was in bed and now feels better in a few hours. No side effects.   No new changes    Brain MRI reviewed and no acute findings to explain headaches on non contrast brain MRI  MR BRAIN W/O CONTRAST 6/26/2022 1:06 PM        Findings: Several scattered nonspecific foci of T2 white matter  hyperintensity in both cerebral hemispheres, greatest in the frontal  lobes. No intracranial mass lesion, mass effect, midline shift, or  abnormal fluid collection. The ventricles and sulci are normal for  age. No abnormality of reduced diffusion.  Normal intravascular flow  voids.                                                                      Impression: No acute intracranial pathology. Several scattered  nonspecific foci of T2 white matter hyperintensity, slightly out of  proportion to age.     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   Limit use of sumatriptan to no more than 9 days per month and use it if blood pressure controlled   Check blood pressure at home and record for a week -morning and before bedtime and if remains talk to your primary  Stress management , hydration, activity level   Headache Clinic follow up in 9-12 months or sooner if needed         Objective    Pulse 82   SpO2 97%   There is no height or weight on file to calculate BMI.  Physical Exam   GENERAL: healthy, alert and no distress  EYES: Eyes grossly normal to inspection  NEURO: Normal strength and tone,  speech normal, normal casual gait  PSYCH: mentation appears normal, affect normal    I discussed all my recommendations with Jen Chun  who verbalizes understanding and comfortable with the plan.  All of patient's questions were answered from the best of my knowledge.  Patient is in agreement with the plan.     24 minutes spent on the date of the encounter doing face to face visit , chart  review,  meds review, treatment plan, documentation and further activities as noted above    FATOUMATA Ornelas, CNP [facility]  Headache certified  [facility]

## 2023-04-10 NOTE — PATIENT INSTRUCTIONS
Plan:  No changes in headache rescue treatment.   Limit use of sumatriptan to no more than 9 days per month and use it if blood pressure controlled   Check blood pressure at home and record for a week -morning and before bedtime and if remains talk to your primary  Stress management , hydration, activity level   Headache Clinic follow up in 9-12 months or sooner if needed

## 2023-08-18 ENCOUNTER — VIRTUAL VISIT (OUTPATIENT)
Dept: EDUCATION SERVICES | Facility: CLINIC | Age: 43
End: 2023-08-18
Payer: COMMERCIAL

## 2023-08-18 DIAGNOSIS — Z53.9 NO SHOW: Primary | ICD-10-CM

## 2023-08-18 NOTE — PROGRESS NOTES
Patient no-showed today's appointment; appointment was for Nutrition/diet review.  Left voicemail with re-scheduling contact number.     Princess Garcia RD, LD, CDE  Diabetes Education

## 2023-08-18 NOTE — LETTER
8/18/2023         RE: Jen Chun  [address] [place] [address] [address]  [facility] [address]        Dear Colleague,    Thank you for referring your patient, Jen Chun, to the [facility]ABETES EDUC[facility] [place]. Please see a copy of my visit note below.    Patient no-showed today's appointment; appointment was for Nutrition/diet review.  Left voicemail with re-scheduling contact number.     Princess Garcia RD, LD, CDE  Diabetes Education

## 2024-04-16 ASSESSMENT — HEADACHE IMPACT TEST (HIT 6)
HOW OFTEN DID HEADACHS LIMIT CONCENTRATION ON WORK OR DAILY ACTIVITY: RARELY
WHEN YOU HAVE A HEADACHE HOW OFTEN DO YOU WISH YOU COULD LIE DOWN: SOMETIMES
HOW OFTEN HAVE YOU FELT FED UP OR IRRITATED BECAUSE OF YOUR HEADACHES: RARELY
WHEN YOU HAVE HEADACHES HOW OFTEN IS THE PAIN SEVERE: SOMETIMES
HOW OFTEN DO HEADACHES LIMIT YOUR DAILY ACTIVITIES: RARELY
HOW OFTEN HAVE YOU FELT TOO TIRED TO WORK BECAUSE OF YOUR HEADACHES: RARELY
HIT6 TOTAL SCORE: 52

## 2024-04-16 ASSESSMENT — MIGRAINE DISABILITY ASSESSMENT (MIDAS)
HOW MANY DAYS DID YOU NOT DO HOUSEWORK BECAUSE OF HEADACHES: 5
HOW OFTEN WERE SOCIAL ACTIVITIES MISSED DUE TO HEADACHES: 5
HOW MANY DAYS WAS HOUSEWORK PRODUCTIVITY CUT IN HALF DUE TO HEADACHES: 5
TOTAL SCORE: 23
HOW MANY DAYS WAS YOUR PRODUCTIVITY CUT IN HALF BECAUSE OF HEADACHES: 5
HOW MANY DAYS DID YOU MISS WORK OR SCHOOL BECAUSE OF HEADACHES: 3
ON A SCALE FROM 0-10 ON AVERAGE HOW PAINFUL WERE HEADACHES: 6
HOW MANY DAYS IN THE PAST 3 MONTHS HAVE YOU HAD A HEADACHE: 8

## 2024-04-18 ENCOUNTER — OFFICE VISIT (OUTPATIENT)
Dept: NEUROLOGY | Facility: CLINIC | Age: 44
End: 2024-04-18
Payer: COMMERCIAL

## 2024-04-18 VITALS
DIASTOLIC BLOOD PRESSURE: 93 MMHG | RESPIRATION RATE: 16 BRPM | HEART RATE: 105 BPM | SYSTOLIC BLOOD PRESSURE: 138 MMHG | OXYGEN SATURATION: 98 %

## 2024-04-18 DIAGNOSIS — G43.009 MIGRAINE WITHOUT AURA AND WITHOUT STATUS MIGRAINOSUS, NOT INTRACTABLE: ICD-10-CM

## 2024-04-18 PROCEDURE — 99212 OFFICE O/P EST SF 10 MIN: CPT | Performed by: NURSE PRACTITIONER

## 2024-04-18 RX ORDER — UBIDECARENONE 75 MG
1 CAPSULE ORAL
COMMUNITY
Start: 2023-12-19

## 2024-04-18 RX ORDER — NAPROXEN 500 MG/1
500 TABLET ORAL EVERY 12 HOURS PRN
Qty: 30 TABLET | Refills: 3 | Status: SHIPPED | OUTPATIENT
Start: 2024-04-18

## 2024-04-18 RX ORDER — RIBOFLAVIN (VITAMIN B2) 100 MG
200 TABLET ORAL DAILY
Qty: 60 TABLET | Refills: 12 | Status: SHIPPED | OUTPATIENT
Start: 2024-04-18

## 2024-04-18 RX ORDER — SUMATRIPTAN 50 MG/1
50 TABLET, FILM COATED ORAL
Qty: 12 TABLET | Refills: 11 | Status: SHIPPED | OUTPATIENT
Start: 2024-04-18

## 2024-04-18 RX ORDER — LANOLIN ALCOHOL/MO/W.PET/CERES
1 CREAM (GRAM) TOPICAL
COMMUNITY
Start: 2023-12-19 | End: 2024-04-18

## 2024-04-18 ASSESSMENT — PAIN SCALES - GENERAL: PAINLEVEL: NO PAIN (0)

## 2024-04-18 NOTE — PATIENT INSTRUCTIONS
Plan:  Rescue treatment as needed -tylenol or/and naproxen for mild moderate headache   Sumatriptan as needed for severe headache. Limit use to no more than 9 days per month   Vit B2 200 mg daily for migraine prevention   Follow up as needed in a year if stable for prescriptions refill or we can ask your primary to fill your medications in the future.

## 2024-04-18 NOTE — PROGRESS NOTES
Subjective   Jen is a 43 year old, presenting for the following health issues:headahce   RECHECK    Headaches much improved. Tylenol or naproxen about 3-5 times in the past 6 month   Sumatriptan 1-2 times in the past 6 month when feels nauseous.   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     Plan:  Rescue treatment as needed -tylenol or/and naproxen for mild moderate headache   Sumatriptan as needed for severe headache. Limit use to no more than 9 days per month   Vit B2 200 mg daily for migraine prevention   Follow up as needed in a year if stable for prescriptions refill or we can ask your primary to fill your medications in the future.       Objective    BP (!) 138/93 (BP Location: Right arm, Patient Position: Sitting, Cuff Size: Adult Regular)   Pulse 105   Resp 16   SpO2 98%   There is no height or weight on file to calculate BMI.  Physical Exam   Patient is alert and no in apparent acute distress,  mentation appears normal, judgement and insight intact, normal speech.    I discussed all my recommendations with Jen KAYLA Lay who verbalizes understanding and comfortable with the plan.     14 minutes spent on the date of the encounter doing face to face visit, chart  review,  meds review, treatment plan, documentation and further activities as noted above    FATOUMATA Ornelas, CNP [facility]  Headache certified  [facility]

## 2024-04-18 NOTE — NURSING NOTE
Chief Complaint   Patient presents with    RECHECK     BP (!) 138/93 (BP Location: Right arm, Patient Position: Sitting, Cuff Size: Adult Regular)   Pulse 105   Resp 16   SpO2 98%     BRANDY NONI

## 2024-04-18 NOTE — LETTER
4/18/2024       RE: Jen Chun  [address]  [place] [id number]       Dear Colleague,    Thank you for referring your patient, Jen Chun, to the [facility] [place] [facility]. Please see a copy of my visit note below.      Subjective  Jen is a 43 year old, presenting for the following health issues:headahce   RECHECK    Headaches much improved. Tylenol or naproxen about 3-5 times in the past 6 month   Sumatriptan 1-2 times in the past 6 month when feels nauseous.   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.     Plan:  Rescue treatment as needed -tylenol or/and naproxen for mild moderate headache   Sumatriptan as needed for severe headache. Limit use to no more than 9 days per month   Vit B2 200 mg daily for migraine prevention   Follow up as needed in a year if stable for prescriptions refill or we can ask your primary to fill your medications in the future.       Objective   BP (!) 138/93 (BP Location: Right arm, Patient Position: Sitting, Cuff Size: Adult Regular)   Pulse 105   Resp 16   SpO2 98%   There is no height or weight on file to calculate BMI.  Physical Exam   Patient is alert and no in apparent acute distress,  mentation appears normal, judgement and insight intact, normal speech.    I discussed all my recommendations with Jen Chun who verbalizes understanding and comfortable with the plan.     14 minutes spent on the date of the encounter doing face to face visit, chart  review,  meds review, treatment plan, documentation and further activities as noted above          Again, thank you for allowing me to participate in the care of your patient.      Sincerely,    FATOUMATA Donohue CNP

## 2024-04-25 ENCOUNTER — TELEPHONE (OUTPATIENT)
Dept: NEUROLOGY | Facility: CLINIC | Age: 44
End: 2024-04-25
Payer: COMMERCIAL

## 2024-04-25 NOTE — TELEPHONE ENCOUNTER
Patient confirmed scheduled appointment:  Date: 4/8/25  Time: 11:30am  Visit type: Return headache  Provider: Rita HAM CNP  Location: [facility]  Testing/imaging: NA  Additional notes: 1yr follow up    Renae Archer on 4/25/2024 at 10:32 AM

## 2024-10-08 ENCOUNTER — TELEPHONE (OUTPATIENT)
Dept: NEUROLOGY | Facility: CLINIC | Age: 44
End: 2024-10-08
Payer: COMMERCIAL

## 2024-10-08 NOTE — TELEPHONE ENCOUNTER
Spoke to patient, she is requesting accommodations for bathroom priveleges at work. Currently, her employer is requiring that she find a relief employee before going to the bathroom. This can take up to ~10 minutes. Due to current medication, she needs to be allowed access when she needs to go to bathroom. Submitting letter to [employer].

## 2024-10-08 NOTE — TELEPHONE ENCOUNTER
[facility] Call Center    Phone Message    May a detailed message be left on voicemail: yes     Reason for Call: Other: Carey (HR at patients employer) calls in stating they received the letter for reasonable accommodations but there is no end date. Carey is calling to see if one can be provided.     Carey can be reached at [phone] to discuss    Action Taken: Message routed to:  [facility]    Travel Screening: Not Applicable

## 2024-10-09 NOTE — TELEPHONE ENCOUNTER
LVM - relayed that there is no specific end date since this is a chronic condition. Needing fax # to send if they need a revised letter. Clinic # provided if they want to discuss. Can Teams Lisa Matthews to see if available to discuss.

## 2024-10-11 NOTE — TELEPHONE ENCOUNTER
"Based on her reported headaches frequency on 4/18/2024,   \"Headaches much improved. Tylenol or naproxen about 3-5 times in the past 6 month   Sumatriptan 1-2 times in the past 6 month when feels nauseous.   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 Patient did not send [place] message or call to tell us that her headaches worsen in the past 6 months.   Should contact her PCP or schedule a return visit first available to discuss changes.   We can provider with FMLA as needed intermittent live. Reduced hours or disability would require much more info and functionality testing which we do not do in our Clinic.     Thank you  "

## 2024-10-11 NOTE — TELEPHONE ENCOUNTER
Patient calling in regards to increase migraines with stress of her job. Her management has placed a final warning on her due to going to  first for an accommodation letter. Due to the stress and policies of her job, she feels very stressed where her migraines are increasing. She states current therapy with Sumatriptan and Naproxen is helpful.     She is requesting an accommodation letter for reduced work hours 8-16 hours/week. Also requesting an accommodation letter that she should not have to wear blazer due to sweating/hot flashes.    We discussed that she may be looking into new job roles to decrease stress levels. Last visit was 4/18/24. Will have to get OK from provider if OK to supply work accommodation letter.

## 2024-12-03 ENCOUNTER — TELEPHONE (OUTPATIENT)
Dept: NEUROLOGY | Facility: CLINIC | Age: 44
End: 2024-12-03
Payer: COMMERCIAL

## 2024-12-03 NOTE — TELEPHONE ENCOUNTER
Patient needs to see PCP ASAP for her symptoms. It sounds new symptoms needs to be evaluated soon.

## 2024-12-03 NOTE — TELEPHONE ENCOUNTER
10/29: Seen for a physical with PCP, labs normal.     11/18: Started having numbness on L leg that runs down to her foot. Also is having vaginal/buttocks numbness. Denies any other new symptoms.    12/2: Gynecology visit- tests were negative.     Discussed that typical recommendation would be to schedule appt with PCP to evaluate for causes of numbness. She has not had any further imaging done. Discussed that the headache clinic only manages headaches, no other neurological issue. Would need a referral to neurology.

## 2024-12-03 NOTE — TELEPHONE ENCOUNTER
[facility] Call Center    Phone Message    May a detailed message be left on voicemail: yes     Reason for Call: Symptoms or Concerns     If patient has red-flag symptoms, warm transfer to triage line    Current symptom or concern: Patient is experiencing numbness in her left leg down to foot and also in buttock.    Patient was seen by her PCP and Gynecologist.  Tests and blood work came back normal.     Symptoms have been present for:   Started Novemebt 18th    Has patient previously been seen for this? No    By Rita Finley    Date: ASAP    Are there any new or worsening symptoms? Yes:     Action Taken: [facility]  Travel Screening: Not Applicable     Date of Service:

## 2024-12-14 ENCOUNTER — HEALTH MAINTENANCE LETTER (OUTPATIENT)
Age: 44
End: 2024-12-14

## 2025-01-28 ENCOUNTER — TRANSCRIBE ORDERS (OUTPATIENT)
Dept: OTHER | Age: 45
End: 2025-01-28

## 2025-01-28 DIAGNOSIS — M43.05 SPONDYLOLYSIS OF THORACOLUMBAR REGION: Primary | ICD-10-CM

## 2025-03-19 ENCOUNTER — THERAPY VISIT (OUTPATIENT)
Dept: PHYSICAL THERAPY | Facility: REHABILITATION | Age: 45
End: 2025-03-19
Payer: COMMERCIAL

## 2025-03-19 DIAGNOSIS — M43.05 SPONDYLOLYSIS OF THORACOLUMBAR REGION: Primary | ICD-10-CM

## 2025-03-19 PROCEDURE — 97161 PT EVAL LOW COMPLEX 20 MIN: CPT | Mod: GP

## 2025-03-19 PROCEDURE — 97110 THERAPEUTIC EXERCISES: CPT | Mod: GP

## 2025-03-19 NOTE — PROGRESS NOTES
PHYSICAL THERAPY EVALUATION  Type of Visit: Evaluation       Fall Risk Screen:  Fall screen completed by: PT  Have you fallen 2 or more times in the past year?: No  Have you fallen and had an injury in the past year?: No  Is patient a fall risk?: No    Subjective         Presenting condition or subjective complaint:    Date of onset: 01/28/25    Relevant medical history:     Patient Active Problem List   Diagnosis    Spondylolysis of thoracolumbar region       Dates & types of surgery:      Prior diagnostic imaging/testing results:       Prior therapy history for the same diagnosis, illness or injury:        Prior Level of Function  IND    Living Environment  Social support:   IND  Type of home:     Stairs to enter the home:         Ramp:     Stairs inside the home:         Help at home:  none   Equipment owned:       Employment:    New job,    Hobbies/Interests:  comedy, doing comedy and improv    Patient goals for therapy:  Getting some knowledge to improve back posture and strength     Pain assessment: Pain present     Objective   LUMBAR SPINE EVALUATION  PAIN: Pain Level at Rest: 0/10  Pain Level with Use: 0/10  Pain Location: Midline low back   Pain Quality: Aching and Dull  Pain Frequency: intermittent  Pain is Worst: n/a  Pain is Exacerbated By: prolonged standing  Pain is Relieved By: rest and massage, epsom salt baths   Pain Progression: Improved  INTEGUMENTARY (edema, incisions): WNL  POSTURE: WNL  GAIT:   Weightbearing Status: WBAT  Assistive Device(s): None  Gait Deviations: WNL  BALANCE/PROPRIOCEPTION:   WEIGHTBEARING ALIGNMENT:   NON-WEIGHTBEARING ALIGNMENT:    ROM:   (Degrees) Left AROM Left PROM  Right AROM Right PROM   Hip Flexion       Hip Extension       Hip Abduction       Hip Adduction       Hip Internal Rotation       Hip External Rotation       Knee Flexion       Knee Extension       Lumbar Side glide Min loss, no pain     Lumbar Flexion No loss, stretch    Lumbar Extension Min loss,  stretch   Pain: Reports stiffness with mobility, no pain   End feel: normal   PELVIC/SI SCREEN:   STRENGTH: WNL    MYOTOMES: WNL  DTR S:    Left Right   C5 (Biceps)     C6 (Brachioradialis)     C7 (Triceps)     L4 (Quad) 2 2   S1 (Achilles) 1 1     CORD SIGNS:   DERMATOMES: WNL  NEURAL TENSION:   FLEXIBILITY: WFL  LUMBAR/HIP Special Tests:  negative     PELVIS/SI SPECIAL TESTS:   FUNCTIONAL TESTS:  IND  PALPATION:  n/a  SPINAL SEGMENTAL CONCLUSIONS: WFL      Assessment & Plan   CLINICAL IMPRESSIONS  Medical Diagnosis: M43.05 (ICD-10-CM) - Spondylolysis of thoracolumbar region    Treatment Diagnosis: back pain   Impression/Assessment: Patient is a 44 year old female with back pain complaints.  The following significant findings have been identified: Pain, Decreased ROM/flexibility, Decreased joint mobility, Decreased strength, Impaired balance, Impaired sensation, Impaired gait, Impaired muscle performance, Decreased activity tolerance, and Impaired posture. These impairments interfere with their ability to perform self care tasks, work tasks, recreational activities, household chores, driving , household mobility, and community mobility as compared to previous level of function. Pt presents to PT; arrives 15 minutes late to eval. Has hx of back pain and n/t into the L leg since last year. N/t resoved itself by December though is having concerns with core and back strength given more recent MRI imaging and anatomy. At this time pt is not exeperiencing significant pain, though would benefit from couple sessions of PT for general strength program to maintain decreased back pain.     Clinical Decision Making (Complexity):  Clinical Presentation: Stable/Uncomplicated  Clinical Presentation Rationale: based on medical and personal factors listed in PT evaluation  Clinical Decision Making (Complexity): Low complexity    PLAN OF CARE  Treatment Interventions:  Interventions: Gait Training, Manual Therapy, Neuromuscular  Re-education, Therapeutic Activity, Therapeutic Exercise, Self-Care/Home Management    Long Term Goals     PT Goal 1  Goal Identifier: HEP  Goal Description: Patient will be independent in self-management of condition and HEP to reach functional goals.  Target Date: 06/11/25  PT Goal 2  Goal Identifier: Standing  Goal Description: Pt will be able to continue to stand for 30+ minutes at a time to be able to complete work tasks  Target Date: 06/11/25      Frequency of Treatment: 1x/wk  Duration of Treatment: 12 weeks    Recommended Referrals to Other Professionals:   Education Assessment:   Learner/Method: Patient  Education Comments: Verbalizes understanding    Risks and benefits of evaluation/treatment have been explained.   Patient/Family/caregiver agrees with Plan of Care.     Evaluation Time:     PT Eval, Low Complexity Minutes (28026): 20       Signing Clinician: KHADAR FISHER, PT